# Patient Record
(demographics unavailable — no encounter records)

---

## 2024-11-14 NOTE — HISTORY OF PRESENT ILLNESS
[de-identified] : Pt for f/u anxiety, htn. Pt on hctz tolerating well. BP at home 120s/70-80s. 90 disatolic occasionally. Anxiety improving w/ less stress at work, pt seeing psych NP Mel Son once a  month. Pt on venlafaxine  [id number] q day, bupropion XL 300mg+[id number] q day, vraylar 1.5mg, trazodone 100mg, and lorazepam 0.5mg q day Pt stopped psych meds 5 weeks ago as he wasn't able to afford f/u w/ psychiatrist due to insurance issues after insurance lapse after job loss. Since then pt experiencing withdrawal symptoms of fatigue, hot flashes, wt gain and depression/anxiety.  Pt has hx of hashimoto's, not on meds at this time.

## 2024-11-14 NOTE — ASSESSMENT
[FreeTextEntry1] : anxiety, depression - severe. pt to get set up with a new psychiatrist. restart trazodone, effexor, vraylar, wellbutrin, lorazepam. f/u in 4-6 weeks hypothyroidism - check TFTs may need to start thyroid med htn- not controlled bp high today 138/96. increase hctz to 25mg q day. f/u in 1 month

## 2024-11-14 NOTE — HEALTH RISK ASSESSMENT
[3] : 2) Feeling down, depressed, or hopeless for nearly every day (3) [PHQ-2 Positive] : PHQ-2 Positive [Nearly Every Day (3)] : 8.) Moving or speaking so slowly that other people could have noticed, or the opposite, moving or speaking faster than usual? Nearly every day [Not at All (0)] : 9.) Thoughts that you would be off dead or of hurting yourself in some way? Not at all [Severe] : Severity of Depression is Severe [Extremely Difficult] : How difficult have these problems made it for you to do your work, take care of things at home, or get along with people? Extremely difficult [PHQ-9 Positive] : PHQ-9 Positive [I have developed a follow-up plan documented below in the note.] : I have developed a follow-up plan documented below in the note. [[id number]] : 6 [OON1VsmpzNfwoz] : 24

## 2025-01-02 NOTE — HEALTH RISK ASSESSMENT
[PHQ-2 Positive] : PHQ-2 Positive [Not at All (0)] : 9.) Thoughts that you would be off dead or of hurting yourself in some way? Not at all [Extremely Difficult] : How difficult have these problems made it for you to do your work, take care of things at home, or get along with people? Extremely difficult [PHQ-9 Positive] : PHQ-9 Positive [I have developed a follow-up plan documented below in the note.] : I have developed a follow-up plan documented below in the note. [2] : 2) Feeling down, depressed, or hopeless for more than half of the days (2) [Several Days (1)] : 5.) Poor appetite or overeating? Several days [1/2 of Days or More (2)] : 8.) Moving or speaking so slowly that other people could have noticed, or the opposite, moving or speaking faster than usual? Half the days or more [Moderate] : Severity of Depression is Moderate [QSJ1Ebwns] : 4 [[id number]] : 14

## 2025-01-02 NOTE — HEALTH RISK ASSESSMENT
[PHQ-2 Positive] : PHQ-2 Positive [Not at All (0)] : 9.) Thoughts that you would be off dead or of hurting yourself in some way? Not at all [Extremely Difficult] : How difficult have these problems made it for you to do your work, take care of things at home, or get along with people? Extremely difficult [PHQ-9 Positive] : PHQ-9 Positive [I have developed a follow-up plan documented below in the note.] : I have developed a follow-up plan documented below in the note. [2] : 2) Feeling down, depressed, or hopeless for more than half of the days (2) [Several Days (1)] : 5.) Poor appetite or overeating? Several days [1/2 of Days or More (2)] : 8.) Moving or speaking so slowly that other people could have noticed, or the opposite, moving or speaking faster than usual? Half the days or more [Moderate] : Severity of Depression is Moderate [LAG8Pxtcr] : 4 [SYK3AmjooPpxne] : 14

## 2025-01-02 NOTE — HISTORY OF PRESENT ILLNESS
[de-identified] : Pt for f/u anxiety, htn. Pt on hctz tolerating well. BP at home 120s/70-80s. 90 disatolic occasionally. Anxiety improving w/ less stress at work, pt was seeing psych NP Mel Son once a  month. Pt on venlafaxine  [id number] q day, bupropion XL 300mg+DU152ar q day, trazodone 100mg, and lorazepam 0.5mg q day. Pt feels xanax was working better for him before. Pt has appt with psych next month. Mood has improved significantly. BP improved on higher dose of hctz now on 25mg.  Pt has hx of hashimoto's, not on meds at this time.  Pt found to have elev Hb

## 2025-01-02 NOTE — ASSESSMENT
[FreeTextEntry1] : anxiety, depression - cont trazodone, effexor, wellbutri. d/c lorazepam, refill xanax prn. f/u with psych hypothyroidism - check TFTs may need to start thyroid med htn- not controlled bp high today. cont hctz 25mg q day. restart losartan 50mg q day. f/u in 2- 3mos elev hb - recheck cbc PND - fluticasone

## 2025-01-02 NOTE — HISTORY OF PRESENT ILLNESS
[de-identified] : Pt for f/u anxiety, htn. Pt on hctz tolerating well. BP at home 120s/70-80s. 90 disatolic occasionally. Anxiety improving w/ less stress at work, pt was seeing psych NP Mel Son once a  month. Pt on venlafaxine  [id number] q day, bupropion XL 300mg+AU632jf q day, trazodone 100mg, and lorazepam 0.5mg q day. Pt feels xanax was working better for him before. Pt has appt with psych next month. Mood has improved significantly. BP improved on higher dose of hctz now on 25mg.  Pt has hx of hashimoto's, not on meds at this time.  Pt found to have elev Hb

## 2025-03-24 NOTE — ASSESSMENT
[FreeTextEntry1] : anxiety, depression - cont trazodone, effexor, wellbutrin. d/c lorazepam, refill xanax prn. f/u with psych hypothyroidism - check TFTs may need to start thyroid med htn- not controlled bp high today. cont hctz 25mg q day. restart losartan 50mg q day. f/u in 2- 3mos elev hb - recheck cbc PND - fluticasone

## 2025-03-30 NOTE — PLAN
[FreeTextEntry1] : htn- Controlled, refill hctz 25mg q day, losartan 50mg q day elev hb - recheck cbc tremor - check labs. if no improvement see neuro. also possible AE from vraylar? advised pt to f/u with psych to discuss

## 2025-03-30 NOTE — HEALTH RISK ASSESSMENT
[2] : 2) Feeling down, depressed, or hopeless for more than half of the days (2) [PHQ-2 Positive] : PHQ-2 Positive [Several Days (1)] : 5.) Poor appetite or overeating? Several days [1/2 of Days or More (2)] : 8.) Moving or speaking so slowly that other people could have noticed, or the opposite, moving or speaking faster than usual? Half the days or more [Not at All (0)] : 9.) Thoughts that you would be off dead or of hurting yourself in some way? Not at all [Moderate] : Severity of Depression is Moderate [Extremely Difficult] : How difficult have these problems made it for you to do your work, take care of things at home, or get along with people? Extremely difficult [PHQ-9 Positive] : PHQ-9 Positive [I have developed a follow-up plan documented below in the note.] : I have developed a follow-up plan documented below in the note. [ZOE1Opkmz] : 4 [WTG6FbmzsHoqpt] : 14

## 2025-03-30 NOTE — HISTORY OF PRESENT ILLNESS
[de-identified] : Pt for f/u anxiety, htn. Pt on hctz tolerating well. BP at home 120s/70-80s. 90 disatolic occasionally. Anxiety improving w/ less stress at work, pt now seeing psych NP Mel Son once a month. Pt now on venlafaxine [id number] q day, bupropion XL 300mg+FD957xh q day, trazodone 150mg, and lorazepam 0.5mg q day. Mood has improved significantly. Pt restarted vraylar 1 month ago.  BP improved on higher dose of hctz now on 25mg. and losartan 50mg. BP at home 130s/upper 80s.  Pt has hx of hashimoto's, not on meds at this time.   Pt c/o new intention tremor in hands for past 1 month.

## 2025-03-30 NOTE — HEALTH RISK ASSESSMENT
[2] : 2) Feeling down, depressed, or hopeless for more than half of the days (2) [PHQ-2 Positive] : PHQ-2 Positive [Several Days (1)] : 5.) Poor appetite or overeating? Several days [1/2 of Days or More (2)] : 8.) Moving or speaking so slowly that other people could have noticed, or the opposite, moving or speaking faster than usual? Half the days or more [Not at All (0)] : 9.) Thoughts that you would be off dead or of hurting yourself in some way? Not at all [Moderate] : Severity of Depression is Moderate [Extremely Difficult] : How difficult have these problems made it for you to do your work, take care of things at home, or get along with people? Extremely difficult [PHQ-9 Positive] : PHQ-9 Positive [I have developed a follow-up plan documented below in the note.] : I have developed a follow-up plan documented below in the note. [DXP2Jmbfv] : 4 [[id number]] : 14

## 2025-03-30 NOTE — HISTORY OF PRESENT ILLNESS
[de-identified] : Pt for f/u anxiety, htn. Pt on hctz tolerating well. BP at home 120s/70-80s. 90 disatolic occasionally. Anxiety improving w/ less stress at work, pt now seeing psych NP Mel Son once a month. Pt now on venlafaxine [id number] q day, bupropion XL 300mg+[id number] q day, trazodone 150mg, and lorazepam 0.5mg q day. Mood has improved significantly. Pt restarted vraylar 1 month ago.  BP improved on higher dose of hctz now on 25mg. and losartan 50mg. BP at home 130s/upper 80s.  Pt has hx of hashimoto's, not on meds at this time.   Pt c/o new intention tremor in hands for past 1 month.

## 2025-06-23 NOTE — HISTORY OF PRESENT ILLNESS
[de-identified] : Pt for f/u anxiety, htn. Pt on hctz tolerating well. BP at home 120s/70-80s. 90 disatolic occasionally. Anxiety improving w/ less stress at work, pt now seeing psych NP Mel Son once a month. Pt now on venlafaxine [id number] q day, bupropion XL 300mg+[id number] q day, trazodone 150mg, and lorazepam 0.5mg q day. Mood has improved significantly. Pt now on vraylar 3mg q day  BP improved on higher dose of hctz now on 25mg and losartan 50mg. BP at home 130s/upper 80s.  Pt c/o new intention tremor in hands, started since starting vraylar.  Reviewed last labs,folic acid low will supplementation ALT was high>100, due to recheck.

## 2025-06-23 NOTE — ASSESSMENT
[FreeTextEntry1] : elevated LFTs - recheck lfts, if high check abd US folic acid defic - start folate supplement htn still high incresae losartan to 100mg
FAMILY HISTORY:  Father  Still living? Unknown  FHx: stroke, Age at diagnosis: Age Unknown

## 2025-06-23 NOTE — HEALTH RISK ASSESSMENT
[2] : 2) Feeling down, depressed, or hopeless for more than half of the days (2) [PHQ-2 Positive] : PHQ-2 Positive [Several Days (1)] : 5.) Poor appetite or overeating? Several days [1/2 of Days or More (2)] : 8.) Moving or speaking so slowly that other people could have noticed, or the opposite, moving or speaking faster than usual? Half the days or more [Not at All (0)] : 9.) Thoughts that you would be off dead or of hurting yourself in some way? Not at all [Moderate] : Severity of Depression is Moderate [Extremely Difficult] : How difficult have these problems made it for you to do your work, take care of things at home, or get along with people? Extremely difficult [PHQ-9 Positive] : PHQ-9 Positive [I have developed a follow-up plan documented below in the note.] : I have developed a follow-up plan documented below in the note. [TVZ1Hbjdb] : 4 [[id number]] : 14